# Patient Record
Sex: FEMALE | Race: WHITE | NOT HISPANIC OR LATINO | Employment: OTHER | URBAN - METROPOLITAN AREA
[De-identification: names, ages, dates, MRNs, and addresses within clinical notes are randomized per-mention and may not be internally consistent; named-entity substitution may affect disease eponyms.]

---

## 2024-09-30 ENCOUNTER — APPOINTMENT (OUTPATIENT)
Dept: CT IMAGING | Facility: CLINIC | Age: 69
End: 2024-09-30
Attending: STUDENT IN AN ORGANIZED HEALTH CARE EDUCATION/TRAINING PROGRAM
Payer: COMMERCIAL

## 2024-09-30 ENCOUNTER — HOSPITAL ENCOUNTER (EMERGENCY)
Facility: CLINIC | Age: 69
Discharge: HOME | End: 2024-09-30
Attending: EMERGENCY MEDICINE | Admitting: EMERGENCY MEDICINE
Payer: COMMERCIAL

## 2024-09-30 VITALS
TEMPERATURE: 98.6 F | DIASTOLIC BLOOD PRESSURE: 68 MMHG | OXYGEN SATURATION: 94 % | RESPIRATION RATE: 16 BRPM | HEART RATE: 77 BPM | SYSTOLIC BLOOD PRESSURE: 126 MMHG
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
## 2024-09-30 DIAGNOSIS — N39.0 UTI (URINARY TRACT INFECTION): ICD-10-CM

## 2024-09-30 LAB
ALBUMIN UR-MCNC: NEGATIVE MG/DL
APPEARANCE UR: CLEAR
ATRIAL RATE - MUSE: 75 BPM
BILIRUB UR QL STRIP: NEGATIVE
COLOR UR AUTO: ABNORMAL
DIASTOLIC BLOOD PRESSURE - MUSE: NORMAL MMHG
GLUCOSE UR STRIP-MCNC: NEGATIVE MG/DL
HGB UR QL STRIP: NEGATIVE
INTERPRETATION ECG - MUSE: NORMAL
KETONES UR STRIP-MCNC: NEGATIVE MG/DL
LEUKOCYTE ESTERASE UR QL STRIP: ABNORMAL
MUCOUS THREADS #/AREA URNS LPF: PRESENT /LPF
NITRATE UR QL: POSITIVE
P AXIS - MUSE: -1 DEGREES
PH UR STRIP: 7 [PH] (ref 5–7)
PR INTERVAL - MUSE: 162 MS
QRS DURATION - MUSE: 80 MS
QT - MUSE: 394 MS
QTC - MUSE: 439 MS
R AXIS - MUSE: 78 DEGREES
RBC URINE: 1 /HPF
SP GR UR STRIP: 1.02 (ref 1–1.03)
SQUAMOUS EPITHELIAL: 1 /HPF
SYSTOLIC BLOOD PRESSURE - MUSE: NORMAL MMHG
T AXIS - MUSE: 17 DEGREES
UROBILINOGEN UR STRIP-MCNC: NORMAL MG/DL
VENTRICULAR RATE- MUSE: 75 BPM
WBC URINE: 8 /HPF

## 2024-09-30 PROCEDURE — 93005 ELECTROCARDIOGRAM TRACING: CPT

## 2024-09-30 PROCEDURE — 81003 URINALYSIS AUTO W/O SCOPE: CPT | Performed by: STUDENT IN AN ORGANIZED HEALTH CARE EDUCATION/TRAINING PROGRAM

## 2024-09-30 PROCEDURE — 87186 SC STD MICRODIL/AGAR DIL: CPT | Performed by: STUDENT IN AN ORGANIZED HEALTH CARE EDUCATION/TRAINING PROGRAM

## 2024-09-30 PROCEDURE — 72125 CT NECK SPINE W/O DYE: CPT

## 2024-09-30 PROCEDURE — 70450 CT HEAD/BRAIN W/O DYE: CPT

## 2024-09-30 PROCEDURE — 99285 EMERGENCY DEPT VISIT HI MDM: CPT | Mod: 25

## 2024-09-30 RX ORDER — CEFDINIR 300 MG/1
300 CAPSULE ORAL 2 TIMES DAILY
Qty: 14 CAPSULE | Refills: 0 | Status: SHIPPED | OUTPATIENT
Start: 2024-09-30 | End: 2024-10-07

## 2024-09-30 ASSESSMENT — ACTIVITIES OF DAILY LIVING (ADL)
ADLS_ACUITY_SCORE: 35

## 2024-09-30 ASSESSMENT — COLUMBIA-SUICIDE SEVERITY RATING SCALE - C-SSRS
2. HAVE YOU ACTUALLY HAD ANY THOUGHTS OF KILLING YOURSELF IN THE PAST MONTH?: NO
6. HAVE YOU EVER DONE ANYTHING, STARTED TO DO ANYTHING, OR PREPARED TO DO ANYTHING TO END YOUR LIFE?: NO
1. IN THE PAST MONTH, HAVE YOU WISHED YOU WERE DEAD OR WISHED YOU COULD GO TO SLEEP AND NOT WAKE UP?: NO

## 2024-09-30 NOTE — ED TRIAGE NOTES
Pt arrived via EMS from [facility]. Pt had unwitnessed fall, EMS said the the facility does rounds every 10 minutes, pt would have only been on the floor for 10 minutes or less. Pt denies pain. According to the facility the pt does not normally communicate much.      Triage Assessment (Adult)       Row Name 09/30/24 0836          Triage Assessment    Airway WDL WDL        Respiratory WDL    Respiratory WDL WDL        Peripheral/Neurovascular WDL    Peripheral Neurovascular WDL WDL

## 2024-09-30 NOTE — ED PROVIDER NOTES
History     Chief Complaint:  Fall       HPI   Ana Cain is a 68 year old female with history of dementia (Alzheimer's and Vascular) who presents to ED after an unwitnessed fall at her nursing home; found down by staff who did not believe she had been down for more than about 10 minutes. She is not able to provide much history herself due to dementia and so was unclear whether she hit her head or which direction she fell. She did state she was not feeling any pain.      Independent Historian:    None - report from nursing home in chart    Review of External Notes:  Nursing home staff history as above as well as physician's notes for PMH as above.      Medications:    cefdinir (OMNICEF) 300 MG capsule        Past Medical History:    No past medical history on file.    Past Surgical History:    No past surgical history on file.       Physical Exam   Patient Vitals for the past 24 hrs:   BP Temp Temp src Pulse Resp SpO2   09/30/24 1250 126/68 -- -- 77 -- --   09/30/24 1030 (!) 146/75 -- -- -- -- 94 %   09/30/24 0835 121/71 98.6  F (37  C) Oral 83 16 92 %        Physical Exam  Constitutional:       General: She is not in acute distress.     Appearance: Normal appearance. She is not diaphoretic.   HENT:      Head: Atraumatic.      Mouth/Throat:      Mouth: Mucous membranes are moist.   Eyes:      General: No scleral icterus.     Conjunctiva/sclera: Conjunctivae normal.   Cardiovascular:      Rate and Rhythm: Normal rate.      Heart sounds: Normal heart sounds.   Pulmonary:      Effort: No respiratory distress.      Breath sounds: Normal breath sounds.   Abdominal:      General: Abdomen is flat.   Musculoskeletal:      Cervical back: Neck supple.      Comments: No tenderness or step-offs in C-spine, T-spine, L-spine. No pain, contusions, lacerations, or crepitus in clavicles, shoulders, arms, wrists, or hands as well as legs and feet. No instability, pain, or crepitus in hips.    Skin:     General: Skin is warm.       Findings: No rash.   Neurological:      Mental Status: She is alert.      Cranial Nerves: Cranial nerves 2-12 are intact.      Sensory: Sensation is intact.      Motor: Motor function is intact.      Coordination: Coordination is intact.      Comments: Unclear if oriented; she does not respond to every question but does at times appear to be oriented to place, self and situation.   Psychiatric:         Attention and Perception: Attention normal.         Mood and Affect: Mood normal. Affect is flat.         Speech: Speech is delayed.         Behavior: Behavior is cooperative.         Cognition and Memory: Cognition is impaired. Memory is impaired.         Emergency Department Course   ECG      ECG results from 09/30/24   EKG 12 lead     Value    Systolic Blood Pressure     Diastolic Blood Pressure     Ventricular Rate 75    Atrial Rate 75    SC Interval 162    QRS Duration 80        QTc 439    P Axis -1    R AXIS 78    T Axis 17    Interpretation ECG      Sinus rhythm  Normal ECG  No previous ECGs available  Unconfirmed report - interpretation of this ECG is computer generated - see medical record for final interpretation  Confirmed by - EMERGENCY ROOM, PHYSICIAN ([id number]),  Vicente Marquez ([id number]) on 9/30/2024 11:38:40 AM           Imaging:  CT Cervical Spine w/o Contrast   Final Result   IMPRESSION:   No acute cervical spine fracture.      AILEEN SONG MD            SYSTEM ID:  [id number]      Head CT w/o contrast   Final Result   IMPRESSION:   1.  No acute intracranial injury.   2.  Mild to moderate volume loss and chronic small vessel ischemic   changes.      AILEEN SONG MD            SYSTEM ID:  [id number]          Laboratory:  Labs Ordered and Resulted from Time of ED Arrival to Time of ED Departure   ROUTINE UA WITH MICROSCOPIC REFLEX TO CULTURE - Abnormal       Result Value    Color Urine Light Yellow      Appearance Urine Clear      Glucose Urine Negative      Bilirubin Urine  Negative      Ketones Urine Negative      Specific Gravity Urine 1.017      Blood Urine Negative      pH Urine 7.0      Protein Albumin Urine Negative      Urobilinogen Urine Normal      Nitrite Urine Positive (*)     Leukocyte Esterase Urine Small (*)     Mucus Urine Present (*)     RBC Urine 1      WBC Urine 8 (*)     Squamous Epithelials Urine 1     URINE CULTURE        Procedures   none    Emergency Department Course & Assessments:    Interventions:  Medications - No data to display     Assessments:  See ED course    Independent Interpretation (X-rays, CTs, rhythm strip):  See ED course    Consultations/Discussion of Management or Tests:  See ED course  ED Course as of 09/30/24 1349   Mon Sep 30, 2024   1348 Patient arrived, physical exam and trauma survey reassuring, CT head and C-spine reassuring, UA showing positive nitrites indicative of UTI       Social Determinants of Health affecting care:  Major mental illness requiring high-level care - Dementia     Disposition:  The patient was discharged.    Impression & Plan    CMS Diagnoses: no sepsis note found          Medical Decision Making:  Ana Cain is a 68 year old female with history of dementia (Alzheimer's and Vascular) who presents to ED after an unwitnessed fall at her nursing home that occurred in the context of a UTI found on UA today. Though she is unable to report the full history given her dementia, her trauma survey without step-offs, lacerations, head injury, hip instability or pain, her normal neuro exam and cardiopulmonary exams were reassuring as were her CT head and C-spine that were wnl. She may have experienced difficulty with balance in context of her UTI. We will send her with a course of cefdinir and have her follow up with PCP and return to ED if signs or symptoms of fracture, neurological compromise, severe pain, or other severe symptoms.    Critical Care time:  was 60 minutes for this patient excluding  procedures.    Diagnosis:    ICD-10-CM    1. Fall, initial encounter  W19.XXXA       2. UTI (urinary tract infection)  N39.0            Discharge Medications:  Discharge Medication List as of 9/30/2024 12:00 PM        START taking these medications    Details   cefdinir (OMNICEF) 300 MG capsule Take 1 capsule (300 mg) by mouth 2 times daily for 7 days., Disp-14 capsule, R-0, E-Prescribe                Jin Garcia MD  9/30/2024   Vaibhav Romero, *              Jin Garcia MD  Resident  09/30/24 1409

## 2024-09-30 NOTE — ED NOTES
Bed: ED08  Expected date: 9/30/24  Expected time: 8:27 AM  Means of arrival:   Comments:  [id number]

## 2024-09-30 NOTE — ED PROVIDER NOTES
Emergency Department Note      History of Present Illness     Chief Complaint   Fall      HPI   Ana Cain is a 68 year old female who presents to the ED following a fall.  She has dementia and lives in a memory unit.  She does not recall events leading up to her fall.  She was found by staff on the floor.  It was felt that she was not on the floor for prolonged time.  She denies any complaints.  Was not clear whether she hit her head though.    Independent Historian   History taken from EMS    Past Medical History     Medical History and Problem List   B12 deficiency  Insomnia  Depression  Alzheimer's and vascular dementia  Hyperlipidemia    Medications   Memantine  Loperamide  Seroquel  Rivastigmine  Rosuvastatin  Sertraline      Physical Exam     Patient Vitals for the past 24 hrs:   BP Temp Temp src Pulse Resp SpO2   09/30/24 1250 126/68 -- -- 77 -- --   09/30/24 1030 (!) 146/75 -- -- -- -- 94 %   09/30/24 0835 121/71 98.6  F (37  C) Oral 83 16 92 %     Physical Exam  Constitutional:       General: She is not in acute distress.     Appearance: Normal appearance. She is not diaphoretic.   HENT:      Head: Atraumatic.      Mouth/Throat:      Mouth: Mucous membranes are moist.   Eyes:      General: No scleral icterus.     Conjunctiva/sclera: Conjunctivae normal.   Cardiovascular:      Rate and Rhythm: Normal rate and regular rhythm.      Heart sounds: Normal heart sounds.   Pulmonary:      Effort: No respiratory distress.      Breath sounds: Normal breath sounds.   Abdominal:      General: Abdomen is flat. There is no distension.   Musculoskeletal:      Cervical back: Neck supple.   Skin:     General: Skin is warm.      Findings: No rash.   Neurological:      General: No focal deficit present.      Mental Status: She is alert.   Psychiatric:         Mood and Affect: Mood normal.         Behavior: Behavior normal.           Diagnostics     Lab Results   Labs Ordered and Resulted from Time of ED Arrival to  Time of ED Departure   ROUTINE UA WITH MICROSCOPIC REFLEX TO CULTURE - Abnormal       Result Value    Color Urine Light Yellow      Appearance Urine Clear      Glucose Urine Negative      Bilirubin Urine Negative      Ketones Urine Negative      Specific Gravity Urine 1.017      Blood Urine Negative      pH Urine 7.0      Protein Albumin Urine Negative      Urobilinogen Urine Normal      Nitrite Urine Positive (*)     Leukocyte Esterase Urine Small (*)     Mucus Urine Present (*)     RBC Urine 1      WBC Urine 8 (*)     Squamous Epithelials Urine 1     URINE CULTURE       Imaging   CT Cervical Spine w/o Contrast   Final Result   IMPRESSION:   No acute cervical spine fracture.      AILEEN SONG MD            SYSTEM ID:  [id number]      Head CT w/o contrast   Final Result   IMPRESSION:   1.  No acute intracranial injury.   2.  Mild to moderate volume loss and chronic small vessel ischemic   changes.      AILEEN SONG MD            SYSTEM ID:  [id number]          EKG   ECG results from 09/30/24   EKG 12 lead     Value    Systolic Blood Pressure     Diastolic Blood Pressure     Ventricular Rate 75    Atrial Rate 75    CT Interval 162    QRS Duration 80        QTc 439    P Axis -1    R AXIS 78    T Axis 17    Interpretation ECG      Sinus rhythm  Normal ECG  No previous ECGs available  Unconfirmed report - interpretation of this ECG is computer generated - see medical record for final interpretation  Confirmed by - EMERGENCY ROOM, PHYSICIAN ([id number]),  Vicente Marquez ([id number]) on 9/30/2024 11:38:40 AM        ED Course      ED Course   ED Course as of 09/30/24 1446   Mon Sep 30, 2024   1348 Patient arrived, physical exam and trauma survey reassuring, CT head and C-spine reassuring, UA showing positive nitrites indicative of UTI       Medical Decision Making / Diagnosis     ANTON Cain is a 68 year old female who presents to the ED following an unwitnessed fall at her care facility.  She had no  complaints.  Imaging is negative.  She does appear to have a UTI and was started on Ceftin.  She is appropriate for transfer back to her memory unit.    Disposition   The patient was discharged.     Diagnosis     ICD-10-CM    1. Fall, initial encounter  W19.XXXA       2. UTI (urinary tract infection)  N39.0            Discharge Medications   Discharge Medication List as of 9/30/2024 12:00 PM        START taking these medications    Details   cefdinir (OMNICEF) 300 MG capsule Take 1 capsule (300 mg) by mouth 2 times daily for 7 days., Disp-14 capsule, R-0, E-Prescribe                     Vaibhav Romero MD  09/30/24 3100

## 2024-09-30 NOTE — DISCHARGE INSTRUCTIONS
What do you do next:   Continue your home medications unless we have specifically changed them  If medications were prescribed today, take these as directed.  You can use over-the-counter acetaminophen (Tylenol ) and ibuprofen for fever or pain control as applicable to your visit today.  Acetaminophen (Tylenol): Take 500 to 1000 mg by mouth every 6 hours as needed for fever or pain.  Do not take more than 4000 total milligrams of acetaminophen-containing products in a 24-hour timeframe.  Ibuprofen: Take 600 milligrams by mouth every 6-8 hours as needed for fever or pain.  Take this with food or milk to avoid stomach upset.  Follow up as indicated below    When do you return: Review your discharge papers for specifics on reasons to return.  If you have dizziness, pain on urination, blood in the urine, or peeing frequently or urgently along with a fever, please come back to the ED. In addition, if you fall, please come back to the ED.    Thank you for allowing us to care for you today.

## 2024-10-02 ENCOUNTER — TELEPHONE (OUTPATIENT)
Dept: NURSING | Facility: CLINIC | Age: 69
End: 2024-10-02
Payer: COMMERCIAL

## 2024-10-02 LAB — BACTERIA UR CULT: ABNORMAL

## 2024-10-02 NOTE — TELEPHONE ENCOUNTER
"[facility]    Reason for call: Lab Result Notification     Lab Result (including Rx patient on, if applicable).  If culture, copy of lab report at bottom.  Lab Result: urine culture - see below    ED Rx: cefdinir (OMNICEF) 300 MG capsule - Take 1 capsule (300 mg) by mouth 2 times daily for 7 days (Susceptible)    Creatinine Level (mg/dl) No results found for: \"CR\" Creatinine clearance (ml/min), if applicable    Creatinine clearance cannot be calculated (No successful lab value found.)     Patient presented to [facility] ED on 9/30/2024 via EMS following a fall.    RN Recommendations/Instructions per [facility] lab result protocol:   [facility] ED lab result protocol utilized: urine culture    Spoke with patient's , Abel, to get the name of the care facility patient resides in.  ask why patient was discharged from the hospital reporting that she had a broken hip that required surgery this morning at [facility]. Informed patient that this writer was not aware of that and offered to have him speak to patient relations.  declined stating he would find a way to get in touch with someone. Per review of ED visit provider noted the following:        Called and left message at [phone] [facility] in [place] to discuss results with nurse. No fax number available.           Destiny Sorenson RN  "

## 2024-10-04 NOTE — TELEPHONE ENCOUNTER
Carla called and provided fax number [phone]. UC results faxed to [facility].

## 2025-01-06 ENCOUNTER — LAB REQUISITION (OUTPATIENT)
Dept: LAB | Facility: CLINIC | Age: 70
End: 2025-01-06
Payer: COMMERCIAL

## 2025-01-06 DIAGNOSIS — M81.0 AGE-RELATED OSTEOPOROSIS WITHOUT CURRENT PATHOLOGICAL FRACTURE: ICD-10-CM

## 2025-01-08 LAB
ANION GAP SERPL CALCULATED.3IONS-SCNC: 11 MMOL/L (ref 7–15)
BASOPHILS # BLD AUTO: 0.1 10E3/UL (ref 0–0.2)
BASOPHILS NFR BLD AUTO: 1 %
BUN SERPL-MCNC: 14.5 MG/DL (ref 8–23)
CALCIUM SERPL-MCNC: 9.7 MG/DL (ref 8.8–10.4)
CHLORIDE SERPL-SCNC: 103 MMOL/L (ref 98–107)
CREAT SERPL-MCNC: 0.62 MG/DL (ref 0.51–0.95)
EGFRCR SERPLBLD CKD-EPI 2021: >90 ML/MIN/1.73M2
EOSINOPHIL # BLD AUTO: 0.4 10E3/UL (ref 0–0.7)
EOSINOPHIL NFR BLD AUTO: 5 %
ERYTHROCYTE [DISTWIDTH] IN BLOOD BY AUTOMATED COUNT: 14.9 % (ref 10–15)
GLUCOSE SERPL-MCNC: 95 MG/DL (ref 70–99)
HCO3 SERPL-SCNC: 26 MMOL/L (ref 22–29)
HCT VFR BLD AUTO: 37.9 % (ref 35–47)
HGB BLD-MCNC: 12.2 G/DL (ref 11.7–15.7)
IMM GRANULOCYTES # BLD: 0 10E3/UL
IMM GRANULOCYTES NFR BLD: 0 %
LYMPHOCYTES # BLD AUTO: 1.9 10E3/UL (ref 0.8–5.3)
LYMPHOCYTES NFR BLD AUTO: 25 %
MCH RBC QN AUTO: 26.7 PG (ref 26.5–33)
MCHC RBC AUTO-ENTMCNC: 32.2 G/DL (ref 31.5–36.5)
MCV RBC AUTO: 83 FL (ref 78–100)
MONOCYTES # BLD AUTO: 0.6 10E3/UL (ref 0–1.3)
MONOCYTES NFR BLD AUTO: 8 %
NEUTROPHILS # BLD AUTO: 4.8 10E3/UL (ref 1.6–8.3)
NEUTROPHILS NFR BLD AUTO: 62 %
NRBC # BLD AUTO: 0 10E3/UL
NRBC BLD AUTO-RTO: 0 /100
PLATELET # BLD AUTO: 342 10E3/UL (ref 150–450)
POTASSIUM SERPL-SCNC: 3.9 MMOL/L (ref 3.4–5.3)
RBC # BLD AUTO: 4.57 10E6/UL (ref 3.8–5.2)
SODIUM SERPL-SCNC: 140 MMOL/L (ref 135–145)
VIT D+METAB SERPL-MCNC: 30 NG/ML (ref 20–50)
WBC # BLD AUTO: 7.8 10E3/UL (ref 4–11)

## 2025-01-08 PROCEDURE — P9604 ONE-WAY ALLOW PRORATED TRIP: HCPCS | Mod: ORL | Performed by: NURSE PRACTITIONER

## 2025-01-08 PROCEDURE — 36415 COLL VENOUS BLD VENIPUNCTURE: CPT | Mod: ORL | Performed by: NURSE PRACTITIONER

## 2025-01-08 PROCEDURE — 82306 VITAMIN D 25 HYDROXY: CPT | Mod: ORL | Performed by: NURSE PRACTITIONER

## 2025-01-08 PROCEDURE — 80048 BASIC METABOLIC PNL TOTAL CA: CPT | Mod: ORL | Performed by: NURSE PRACTITIONER

## 2025-01-08 PROCEDURE — 85025 COMPLETE CBC W/AUTO DIFF WBC: CPT | Mod: ORL | Performed by: NURSE PRACTITIONER

## 2025-04-16 ENCOUNTER — LAB REQUISITION (OUTPATIENT)
Dept: LAB | Facility: CLINIC | Age: 70
End: 2025-04-16
Payer: COMMERCIAL

## 2025-04-16 DIAGNOSIS — G30.9 ALZHEIMER'S DISEASE, UNSPECIFIED (CODE) (H): ICD-10-CM

## 2025-04-16 DIAGNOSIS — R26.81 UNSTEADINESS ON FEET: ICD-10-CM

## 2025-04-16 DIAGNOSIS — N30.00 ACUTE CYSTITIS WITHOUT HEMATURIA: ICD-10-CM

## 2025-04-16 DIAGNOSIS — I82.402 ACUTE EMBOLISM AND THROMBOSIS OF UNSPECIFIED DEEP VEINS OF LEFT LOWER EXTREMITY (H): ICD-10-CM

## 2025-04-16 DIAGNOSIS — F32.4 MAJOR DEPRESSIVE DISORDER, SINGLE EPISODE, IN PARTIAL REMISSION: ICD-10-CM

## 2025-04-23 LAB
ERYTHROCYTE [DISTWIDTH] IN BLOOD BY AUTOMATED COUNT: 14.9 % (ref 10–15)
HCT VFR BLD AUTO: 37.4 % (ref 35–47)
HGB BLD-MCNC: 11.6 G/DL (ref 11.7–15.7)
MCH RBC QN AUTO: 26.8 PG (ref 26.5–33)
MCHC RBC AUTO-ENTMCNC: 31 G/DL (ref 31.5–36.5)
MCV RBC AUTO: 86 FL (ref 78–100)
PLATELET # BLD AUTO: 462 10E3/UL (ref 150–450)
RBC # BLD AUTO: 4.33 10E6/UL (ref 3.8–5.2)
WBC # BLD AUTO: 8 10E3/UL (ref 4–11)

## 2025-04-23 PROCEDURE — 85027 COMPLETE CBC AUTOMATED: CPT | Mod: ORL | Performed by: NURSE PRACTITIONER

## 2025-04-23 PROCEDURE — 36415 COLL VENOUS BLD VENIPUNCTURE: CPT | Mod: ORL | Performed by: NURSE PRACTITIONER

## 2025-04-23 PROCEDURE — P9604 ONE-WAY ALLOW PRORATED TRIP: HCPCS | Mod: ORL | Performed by: NURSE PRACTITIONER

## 2025-06-11 ENCOUNTER — LAB REQUISITION (OUTPATIENT)
Dept: LAB | Facility: CLINIC | Age: 70
End: 2025-06-11
Payer: COMMERCIAL

## 2025-06-11 DIAGNOSIS — G30.9 ALZHEIMER'S DISEASE, UNSPECIFIED (CODE) (H): ICD-10-CM

## 2025-06-11 DIAGNOSIS — R26.89 OTHER ABNORMALITIES OF GAIT AND MOBILITY: ICD-10-CM

## 2025-06-11 DIAGNOSIS — M81.0 AGE-RELATED OSTEOPOROSIS WITHOUT CURRENT PATHOLOGICAL FRACTURE: ICD-10-CM

## 2025-06-18 LAB
ALBUMIN SERPL BCG-MCNC: 3.8 G/DL (ref 3.5–5.2)
ALP SERPL-CCNC: 66 U/L (ref 40–150)
ALT SERPL W P-5'-P-CCNC: 9 U/L (ref 0–50)
ANION GAP SERPL CALCULATED.3IONS-SCNC: 13 MMOL/L (ref 7–15)
AST SERPL W P-5'-P-CCNC: 24 U/L (ref 0–45)
BILIRUB SERPL-MCNC: 0.2 MG/DL
BUN SERPL-MCNC: 20 MG/DL (ref 8–23)
CALCIUM SERPL-MCNC: 9.6 MG/DL (ref 8.8–10.4)
CHLORIDE SERPL-SCNC: 106 MMOL/L (ref 98–107)
CREAT SERPL-MCNC: 0.73 MG/DL (ref 0.51–0.95)
EGFRCR SERPLBLD CKD-EPI 2021: 87 ML/MIN/1.73M2
ERYTHROCYTE [DISTWIDTH] IN BLOOD BY AUTOMATED COUNT: 16 % (ref 10–15)
GLUCOSE SERPL-MCNC: 102 MG/DL (ref 70–99)
HCO3 SERPL-SCNC: 23 MMOL/L (ref 22–29)
HCT VFR BLD AUTO: 40.9 % (ref 35–47)
HGB BLD-MCNC: 12.4 G/DL (ref 11.7–15.7)
MCH RBC QN AUTO: 27.1 PG (ref 26.5–33)
MCHC RBC AUTO-ENTMCNC: 30.3 G/DL (ref 31.5–36.5)
MCV RBC AUTO: 89 FL (ref 78–100)
PLATELET # BLD AUTO: 323 10E3/UL (ref 150–450)
POTASSIUM SERPL-SCNC: 4 MMOL/L (ref 3.4–5.3)
PROT SERPL-MCNC: 6.8 G/DL (ref 6.4–8.3)
RBC # BLD AUTO: 4.58 10E6/UL (ref 3.8–5.2)
SODIUM SERPL-SCNC: 142 MMOL/L (ref 135–145)
VIT D+METAB SERPL-MCNC: 37 NG/ML (ref 20–50)
WBC # BLD AUTO: 6.8 10E3/UL (ref 4–11)

## 2025-06-18 PROCEDURE — 85027 COMPLETE CBC AUTOMATED: CPT | Mod: ORL | Performed by: NURSE PRACTITIONER

## 2025-06-18 PROCEDURE — 36415 COLL VENOUS BLD VENIPUNCTURE: CPT | Mod: ORL | Performed by: NURSE PRACTITIONER

## 2025-06-18 PROCEDURE — 82306 VITAMIN D 25 HYDROXY: CPT | Mod: ORL | Performed by: NURSE PRACTITIONER

## 2025-06-18 PROCEDURE — 80053 COMPREHEN METABOLIC PANEL: CPT | Mod: ORL | Performed by: NURSE PRACTITIONER

## 2025-06-18 PROCEDURE — P9604 ONE-WAY ALLOW PRORATED TRIP: HCPCS | Mod: ORL | Performed by: NURSE PRACTITIONER
